# Patient Record
Sex: MALE
[De-identification: names, ages, dates, MRNs, and addresses within clinical notes are randomized per-mention and may not be internally consistent; named-entity substitution may affect disease eponyms.]

---

## 2024-08-01 ENCOUNTER — DASHBOARD ENCOUNTERS (OUTPATIENT)
Age: 29
End: 2024-08-01

## 2024-08-02 ENCOUNTER — OFFICE VISIT (OUTPATIENT)
Dept: URBAN - METROPOLITAN AREA CLINIC 42 | Facility: CLINIC | Age: 29
End: 2024-08-02
Payer: COMMERCIAL

## 2024-08-02 VITALS
DIASTOLIC BLOOD PRESSURE: 93 MMHG | RESPIRATION RATE: 20 BRPM | WEIGHT: 188.6 LBS | HEIGHT: 66 IN | HEART RATE: 66 BPM | SYSTOLIC BLOOD PRESSURE: 125 MMHG | TEMPERATURE: 97.8 F | BODY MASS INDEX: 30.31 KG/M2

## 2024-08-02 DIAGNOSIS — K75.81 NASH (NONALCOHOLIC STEATOHEPATITIS): ICD-10-CM

## 2024-08-02 PROBLEM — 442685003: Status: ACTIVE | Noted: 2024-08-02

## 2024-08-02 PROCEDURE — 99204 OFFICE O/P NEW MOD 45 MIN: CPT | Performed by: INTERNAL MEDICINE

## 2024-08-02 PROCEDURE — 99244 OFF/OP CNSLTJ NEW/EST MOD 40: CPT | Performed by: INTERNAL MEDICINE

## 2024-08-02 RX ORDER — ERGOCALCIFEROL CAPSULES, 1.25 MG/1
1 CAPSULE CAPSULE ORAL
Status: ACTIVE | COMMUNITY

## 2024-08-02 NOTE — HPI-TODAY'S VISIT:
This patient was referred by Dr. Bob Walker  for an evaluation of elevated liver enzymes.  A copy of this will be sent to the referring provider.  The patient does not have any previous histoyr of liver disease and has had elevated liver function for about a year now.  Work-up including hepatitis serologies and autoimmune test, ferritin, ceruloplasmin, alpha-1-antitrypsin all normal.  US showed fatty liver.

## 2025-02-14 ENCOUNTER — OFFICE VISIT (OUTPATIENT)
Dept: URBAN - METROPOLITAN AREA CLINIC 42 | Facility: CLINIC | Age: 30
End: 2025-02-14
Payer: COMMERCIAL

## 2025-02-14 ENCOUNTER — LAB OUTSIDE AN ENCOUNTER (OUTPATIENT)
Dept: URBAN - METROPOLITAN AREA CLINIC 42 | Facility: CLINIC | Age: 30
End: 2025-02-14

## 2025-02-14 VITALS
WEIGHT: 172.2 LBS | RESPIRATION RATE: 20 BRPM | DIASTOLIC BLOOD PRESSURE: 74 MMHG | SYSTOLIC BLOOD PRESSURE: 112 MMHG | HEART RATE: 84 BPM | HEIGHT: 66 IN | BODY MASS INDEX: 27.68 KG/M2 | TEMPERATURE: 97.3 F

## 2025-02-14 DIAGNOSIS — K75.81 NASH (NONALCOHOLIC STEATOHEPATITIS): ICD-10-CM

## 2025-02-14 PROCEDURE — 99213 OFFICE O/P EST LOW 20 MIN: CPT | Performed by: INTERNAL MEDICINE

## 2025-02-14 RX ORDER — ERGOCALCIFEROL CAPSULES, 1.25 MG/1
1 CAPSULE CAPSULE ORAL
Status: ACTIVE | COMMUNITY

## 2025-02-14 NOTE — HPI-TODAY'S VISIT:
This patient was referred by Dr. Bob Walker  for an evaluation of elevated liver enzymes.  A copy of this will be sent to the referring provider.  The patient does not have any previous histoyr of liver disease and has had elevated liver function for about a year now.  Work-up including hepatitis serologies and autoimmune test, ferritin, ceruloplasmin, alpha-1-antitrypsin all normal.  US showed fatty liver.  2/14/25 The patient is here for a follow-up.  Currently doing well.  No complaints today. Lost 16 lbs since last visit.

## 2025-02-15 LAB
ALBUMIN: 4.9
ALKALINE PHOSPHATASE: 50
ALT (SGPT): 40
AST (SGOT): 26
BILIRUBIN, TOTAL: 0.5
BUN/CREATININE RATIO: 13
BUN: 11
CALCIUM: 10.2
CARBON DIOXIDE, TOTAL: 24
CHLORIDE: 103
CREATININE: 0.86
EGFR: 121
GLOBULIN, TOTAL: 3.1
GLUCOSE: 91
POTASSIUM: 5
PROTEIN, TOTAL: 8
SODIUM: 142

## 2025-02-18 ENCOUNTER — TELEPHONE ENCOUNTER (OUTPATIENT)
Dept: URBAN - METROPOLITAN AREA CLINIC 42 | Facility: CLINIC | Age: 30
End: 2025-02-18